# Patient Record
Sex: MALE | Race: WHITE
[De-identification: names, ages, dates, MRNs, and addresses within clinical notes are randomized per-mention and may not be internally consistent; named-entity substitution may affect disease eponyms.]

---

## 2018-12-21 ENCOUNTER — HOSPITAL ENCOUNTER (OUTPATIENT)
Dept: HOSPITAL 61 - PCVCIMAG | Age: 23
Discharge: HOME | End: 2018-12-21
Attending: INTERNAL MEDICINE
Payer: COMMERCIAL

## 2018-12-21 DIAGNOSIS — I10: Primary | ICD-10-CM

## 2018-12-21 DIAGNOSIS — R00.2: ICD-10-CM

## 2018-12-21 PROCEDURE — 93351 STRESS TTE COMPLETE: CPT

## 2018-12-21 PROCEDURE — 93325 DOPPLER ECHO COLOR FLOW MAPG: CPT

## 2018-12-21 NOTE — PCVCIMAG
--------------- APPROVED REPORT --------------





Study performed:  12/21/2018 15:29:50



Exam:  Stress Echocardiogram

Indication: Hypertension, Palpitations

Patient Location: Echo lab

Stress Nurse: Leida Cintron RN

Room #: 2

Status: routine



Ht: 6 ft 4 in  

HR: 76 bpm      BP: 150/84 mmHg

Rhythm: NSR



Medical History

Medical History: intermittent hypertensive blood pressure 

readings

Previous Cardiac Procedures: none

Pretest Chest Pain Characteristics: No chest pain

Exercise History: Physically active



Procedure

The patient underwent an Exercise Stress Test using the Drew 

Protocol. Blood pressure, heart rate, and EKG were monitored.

An Echocardiogram was performed by technician in four stages in quad 

fashion.  At peak stress, four selected images were obtained and 

placed side by side with resting images for comparison.



Stress Test Details

Stress Test:  Exercise stress testing was performed using a Drew 

protocol.

HR

Resting HR:            176 bpmMax Heart Rate (APMHR): 199 bpm 



Max HR Achieved:  196 bpmTarget HR (85% APMHR): 169 bpm

% of APMHR:         98

Recovery HR:            100 bpm

HR response to stress: Normal HR response to stress



BP

Resting BP:  150/84 mmHg

Max BP:       180/70 mmHg

Recovery BP:       142/86 mmHg

BP response to stress: Normal blood pressure response to 

stress.

ECG

Resting ECG:  Sinus Rhythm

Stress ECG:     Sinus Rhythm

ST Change: Non-ischemic

Arrhythmia:    None

Recovery ECG: Sinus Rhythm

Recovery ST Change: Non-ischemic

Recovery Arrhythmia: None



Clinical

Reason for Termination: Maximal effort

Stress Symptoms: none

Exercise duration: 14 min 59 sec

Highest Stage Achieved: Stage 5: 5.0 mph at 18% grade. 

Exercise capacity: 17.5 METs

Overall Exercise Capacity for Age: Excellent

Scale: Active

Angina Score: None

No complications.



Stress ECG Conclusion

The patient exercised according to the DREW protocol for 14:49  

mins; achieving a work level of  17.5 METS. 

The resting heart rate of 76 bpm devaughn to a maximum heart rate of 196 

bpm. 

This value represent 98% of the maximal, age-predicted heart rate. 

The resting blood pressure of 150/84 mmHg, devaughn to a maximum blood 

pressure of  180/70mmHg. 

The exercise test was stopped due to fatigue.



We were unable to compress the cuff sufficiently for an accurate 

reading in stage I.  The cuff was changed to a large long from a 

regular long. The readings were immediately lower by 70 points. The 

blood pressure was normal throughout the rest of the test and in 

recovwery.



Pre-Stress Echo

The resting Echocardiogram showed normal left ventricular 

contractility with an estimated Ejection Fraction of about 55-60%. 

Normal wall motion in all segments on baseline images.



Post-Stress Echo

The stress Echocardiogram showed normal left ventricular 

contractility with an estimated Ejection Fraction of about 65-70%. 

Normal augmentation of wall motion in all segments on post stress 

images.



Clinical

No clinical or ECG evidence for ischemia.



Conclusion

Clinical Response:  Non-ischemic

Exercise Capacity:  Superior

Stress ECG Response:  Non-ischemic

Stress Echo Images:  Non-ischemic

No clinical, EKG or echocardiographic evidence for ischemia. 

No echocardiographic evidence for exercise induced ischemia.

Normal stress echocardiogram with maximal exercise stress.

No prior study available for comparison.



&lt;Conclusion&gt;

No clinical, EKG or echocardiographic evidence for ischemia. 

No echocardiographic evidence for exercise induced ischemia.

Normal stress echocardiogram with maximal exercise stress.